# Patient Record
Sex: MALE | Race: WHITE | Employment: OTHER | URBAN - METROPOLITAN AREA
[De-identification: names, ages, dates, MRNs, and addresses within clinical notes are randomized per-mention and may not be internally consistent; named-entity substitution may affect disease eponyms.]

---

## 2022-02-04 ENCOUNTER — CONSULTATION/EVALUATION (OUTPATIENT)
Dept: URBAN - METROPOLITAN AREA CLINIC 20 | Facility: CLINIC | Age: 72
End: 2022-02-04

## 2022-02-04 DIAGNOSIS — H52.223: ICD-10-CM

## 2022-02-04 DIAGNOSIS — H25.813: ICD-10-CM

## 2022-02-04 DIAGNOSIS — H35.363: ICD-10-CM

## 2022-02-04 PROCEDURE — 92012 INTRM OPH EXAM EST PATIENT: CPT

## 2022-02-04 PROCEDURE — 92134 CPTRZ OPH DX IMG PST SGM RTA: CPT

## 2022-02-04 PROCEDURE — 92025 CPTRIZED CORNEAL TOPOGRAPHY: CPT

## 2022-02-04 ASSESSMENT — KERATOMETRY
OD_K2POWER_DIOPTERS: 42.50
OS_AXISANGLE_DEGREES: 50
OS_AXISANGLE2_DEGREES: 139
OS_AXISANGLE_DEGREES: 49
OS_AXISANGLE_DEGREES: 57
OD_AXISANGLE_DEGREES: 177
OS_K2POWER_DIOPTERS: 42.00
OD_AXISANGLE2_DEGREES: 87
OS_AXISANGLE2_DEGREES: 147
OD_AXISANGLE_DEGREES: 176
OS_K1POWER_DIOPTERS: 41.50
OD_K1POWER_DIOPTERS: 41.75
OD_K1POWER_DIOPTERS: 41.50
OD_AXISANGLE_DEGREES: 175
OD_AXISANGLE2_DEGREES: 86
OD_AXISANGLE2_DEGREES: 85
OS_K1POWER_DIOPTERS: 41.25
OS_AXISANGLE2_DEGREES: 140

## 2022-02-04 ASSESSMENT — VISUAL ACUITY
OU_SC: 20/40+2
OS_SC: 20/400
OU_CC: J1
OU_SC: J7
OD_CC: 20/70-1
OS_CC: 20/40-1
OU_CC: 20/30
OD_SC: 20/40+1

## 2022-02-04 ASSESSMENT — TONOMETRY
OS_IOP_MMHG: 16
OD_IOP_MMHG: 15

## 2022-02-04 NOTE — PATIENT DISCUSSION
Cataract Surgery Counseling: I have discussed the option of scheduling cataract surgery versus following, as well as the risks, benefits and alternatives of surgery with the patient. It was explained that the surgery is elective, there is no rush and there is no harm in waiting to have surgery. It was also explained that there is no guarantee that removing the cataract will improve their vision. The patient understands and desires to proceed with cataract surgery with implantation of an intraocular lens to improve vision.

## 2022-02-04 NOTE — PATIENT DISCUSSION
Referred to Retina specialist for evaluation and possible treatment, cleared for cataract surgery, see scanned documents for further details.

## 2022-02-04 NOTE — PATIENT DISCUSSION
Discussed basic retinal health - no smoking, UV protection, healthy diet with lots of vegetables and fruits.

## 2022-02-04 NOTE — PATIENT DISCUSSION
Patient elects to proceed with cataract surgery, right eye first with a monofocal distance lens goal of emmetropia OD.

## 2022-02-04 NOTE — PATIENT DISCUSSION
Discussed presence of moderate ERM. Recommended observation for now. Can consider surgical intervention in the future if the ERM progresses and the patient becomes more symptomatic.

## 2022-02-04 NOTE — PATIENT DISCUSSION
Discussed with patient that best corrected vision even after cataract surgery will still be limited due to retinal pathology.

## 2022-03-11 ENCOUNTER — POST-OP (OUTPATIENT)
Dept: URBAN - METROPOLITAN AREA CLINIC 20 | Facility: CLINIC | Age: 72
End: 2022-03-11

## 2022-03-11 DIAGNOSIS — Z96.1: ICD-10-CM

## 2022-03-11 PROCEDURE — 99024 POSTOP FOLLOW-UP VISIT: CPT

## 2022-03-11 ASSESSMENT — TONOMETRY
OS_IOP_MMHG: 16
OD_IOP_MMHG: 17

## 2022-03-11 ASSESSMENT — KERATOMETRY
OD_AXISANGLE2_DEGREES: 87
OD_AXISANGLE_DEGREES: 177
OS_AXISANGLE2_DEGREES: 140
OD_AXISANGLE2_DEGREES: 85
OD_K2POWER_DIOPTERS: 42.50
OS_AXISANGLE_DEGREES: 50
OD_K1POWER_DIOPTERS: 41.75
OS_AXISANGLE2_DEGREES: 139
OD_AXISANGLE_DEGREES: 176
OS_AXISANGLE2_DEGREES: 147
OS_K2POWER_DIOPTERS: 42.00
OS_AXISANGLE_DEGREES: 57
OS_K1POWER_DIOPTERS: 41.50
OS_K1POWER_DIOPTERS: 41.25
OD_K1POWER_DIOPTERS: 41.50
OS_AXISANGLE_DEGREES: 49
OD_AXISANGLE2_DEGREES: 86
OD_AXISANGLE_DEGREES: 175

## 2022-03-11 ASSESSMENT — VISUAL ACUITY
OD_SC: 20/25-1
OS_SC: 20/200

## 2022-03-18 ENCOUNTER — POST-OP (OUTPATIENT)
Dept: URBAN - METROPOLITAN AREA CLINIC 20 | Facility: CLINIC | Age: 72
End: 2022-03-18

## 2022-03-18 DIAGNOSIS — Z96.1: ICD-10-CM

## 2022-03-18 PROCEDURE — 99024 POSTOP FOLLOW-UP VISIT: CPT

## 2022-03-18 ASSESSMENT — KERATOMETRY
OS_K1POWER_DIOPTERS: 41.25
OD_K2POWER_DIOPTERS: 42.50
OD_AXISANGLE2_DEGREES: 85
OS_K2POWER_DIOPTERS: 42.00
OS_AXISANGLE_DEGREES: 49
OS_AXISANGLE2_DEGREES: 147
OD_AXISANGLE_DEGREES: 175
OS_AXISANGLE2_DEGREES: 140
OS_AXISANGLE2_DEGREES: 139
OD_K1POWER_DIOPTERS: 41.50
OD_AXISANGLE2_DEGREES: 87
OD_AXISANGLE_DEGREES: 176
OS_AXISANGLE_DEGREES: 50
OD_K1POWER_DIOPTERS: 41.75
OS_AXISANGLE_DEGREES: 57
OD_AXISANGLE2_DEGREES: 86
OD_AXISANGLE_DEGREES: 177
OS_K1POWER_DIOPTERS: 41.50

## 2022-03-18 ASSESSMENT — TONOMETRY
OS_IOP_MMHG: 18
OD_IOP_MMHG: 19

## 2022-03-18 ASSESSMENT — VISUAL ACUITY
OD_SC: 20/25-1
OS_SC: 20/200

## 2022-03-24 ENCOUNTER — POST-OP (OUTPATIENT)
Dept: URBAN - METROPOLITAN AREA CLINIC 20 | Facility: CLINIC | Age: 72
End: 2022-03-24

## 2022-03-24 DIAGNOSIS — Z96.1: ICD-10-CM

## 2022-03-24 PROCEDURE — 99024 POSTOP FOLLOW-UP VISIT: CPT

## 2022-03-24 ASSESSMENT — KERATOMETRY
OS_AXISANGLE2_DEGREES: 139
OD_K2POWER_DIOPTERS: 42.50
OS_AXISANGLE_DEGREES: 49
OD_AXISANGLE2_DEGREES: 86
OD_K1POWER_DIOPTERS: 41.75
OD_AXISANGLE2_DEGREES: 85
OS_AXISANGLE2_DEGREES: 140
OD_AXISANGLE_DEGREES: 176
OS_AXISANGLE2_DEGREES: 147
OD_AXISANGLE2_DEGREES: 87
OS_K2POWER_DIOPTERS: 42.00
OD_AXISANGLE_DEGREES: 177
OS_K1POWER_DIOPTERS: 41.25
OD_AXISANGLE_DEGREES: 175
OS_AXISANGLE_DEGREES: 50
OS_AXISANGLE_DEGREES: 57
OD_K1POWER_DIOPTERS: 41.50
OS_K1POWER_DIOPTERS: 41.50

## 2022-03-24 ASSESSMENT — TONOMETRY: OS_IOP_MMHG: 22

## 2022-03-24 ASSESSMENT — VISUAL ACUITY: OS_SC: 20/30

## 2022-04-04 ENCOUNTER — POST-OP (OUTPATIENT)
Dept: URBAN - METROPOLITAN AREA CLINIC 20 | Facility: CLINIC | Age: 72
End: 2022-04-04

## 2022-04-04 DIAGNOSIS — Z96.1: ICD-10-CM

## 2022-04-04 PROCEDURE — 99024 POSTOP FOLLOW-UP VISIT: CPT

## 2022-04-04 ASSESSMENT — KERATOMETRY
OS_AXISANGLE2_DEGREES: 140
OD_AXISANGLE_DEGREES: 177
OS_AXISANGLE_DEGREES: 49
OS_K2POWER_DIOPTERS: 42.00
OS_AXISANGLE2_DEGREES: 147
OD_K1POWER_DIOPTERS: 41.50
OD_AXISANGLE2_DEGREES: 87
OD_AXISANGLE2_DEGREES: 85
OD_AXISANGLE_DEGREES: 175
OD_K1POWER_DIOPTERS: 41.75
OS_AXISANGLE_DEGREES: 50
OS_K1POWER_DIOPTERS: 41.25
OD_AXISANGLE_DEGREES: 176
OD_K2POWER_DIOPTERS: 42.50
OS_AXISANGLE2_DEGREES: 139
OS_K1POWER_DIOPTERS: 41.50
OD_AXISANGLE2_DEGREES: 86
OS_AXISANGLE_DEGREES: 57

## 2022-04-04 ASSESSMENT — VISUAL ACUITY
OD_SC: 20/20-2
OS_SC: 20/25-2
OU_SC: J5

## 2022-04-04 ASSESSMENT — TONOMETRY
OD_IOP_MMHG: 17
OS_IOP_MMHG: 16

## 2022-05-16 ENCOUNTER — POST-OP (OUTPATIENT)
Dept: URBAN - METROPOLITAN AREA CLINIC 20 | Facility: CLINIC | Age: 72
End: 2022-05-16

## 2022-05-16 DIAGNOSIS — Z96.1: ICD-10-CM

## 2022-05-16 PROCEDURE — 99024 POSTOP FOLLOW-UP VISIT: CPT

## 2022-05-16 ASSESSMENT — TONOMETRY
OS_IOP_MMHG: 17
OD_IOP_MMHG: 17

## 2022-05-16 ASSESSMENT — VISUAL ACUITY
OS_SC: 20/20-3
OD_SC: 20/20-3
OU_SC: 20/20-3

## 2025-05-19 NOTE — PATIENT DISCUSSION
Patient elects to proceed with cataract surgery, right eye first with a monofocal distance lens goal of emmetropia OD. Right arm;